# Patient Record
Sex: MALE | Race: OTHER | HISPANIC OR LATINO | URBAN - METROPOLITAN AREA
[De-identification: names, ages, dates, MRNs, and addresses within clinical notes are randomized per-mention and may not be internally consistent; named-entity substitution may affect disease eponyms.]

---

## 2018-04-30 ENCOUNTER — EMERGENCY (EMERGENCY)
Facility: HOSPITAL | Age: 33
LOS: 1 days | Discharge: HOME | End: 2018-04-30
Attending: EMERGENCY MEDICINE
Payer: SELF-PAY

## 2018-04-30 VITALS
HEIGHT: 65 IN | WEIGHT: 160.06 LBS | HEART RATE: 68 BPM | RESPIRATION RATE: 20 BRPM | DIASTOLIC BLOOD PRESSURE: 82 MMHG | SYSTOLIC BLOOD PRESSURE: 135 MMHG | OXYGEN SATURATION: 98 % | TEMPERATURE: 98 F

## 2018-04-30 PROCEDURE — 99284 EMERGENCY DEPT VISIT MOD MDM: CPT | Mod: 25

## 2018-04-30 PROCEDURE — 99053 MED SERV 10PM-8AM 24 HR FAC: CPT

## 2018-04-30 PROCEDURE — 99285 EMERGENCY DEPT VISIT HI MDM: CPT

## 2018-04-30 NOTE — ED PROVIDER NOTE - MEDICAL DECISION MAKING DETAILS
clincally sober no signs trauma ambulating refusing rehab resources kindly asking for taxi badck home denies acap or asa abuse return to ed for intractable HA, persistent vomiting, or new onset motor/sensory deficits

## 2018-04-30 NOTE — ED ADULT TRIAGE NOTE - CHIEF COMPLAINT QUOTE
Patient BIBA due to alcohol intox, patient is A&o X3, states he is able to go home via taxi, patient reports he was walking home and was picked up by [employer], no signs of injury, calm and cooperative.

## 2018-04-30 NOTE — ED PROVIDER NOTE - OBJECTIVE STATEMENT
31 y/o male presents to the ED via EMS for evaluation of alcohol intoxication. Pt states he was walking home after drinking, when police brought pt to the ED. Pt denies any trauma. Denies SI, HI, hallucinations. denies fever. denies HA or neck pain. no chest pain or sob. no abd pain. no n/v/d. no urinary f/u/d. no back pain. no motor or sensory deficits. denies illicit drug use. no recent travel. no rash. no other acute issues symptoms or concerns
